# Patient Record
Sex: MALE | Race: WHITE | Employment: FULL TIME | URBAN - METROPOLITAN AREA
[De-identification: names, ages, dates, MRNs, and addresses within clinical notes are randomized per-mention and may not be internally consistent; named-entity substitution may affect disease eponyms.]

---

## 2018-03-08 ENCOUNTER — OFFICE VISIT (OUTPATIENT)
Dept: ORTHOPEDIC SURGERY | Age: 58
End: 2018-03-08

## 2018-03-08 VITALS
SYSTOLIC BLOOD PRESSURE: 118 MMHG | HEART RATE: 78 BPM | WEIGHT: 175 LBS | BODY MASS INDEX: 23.7 KG/M2 | HEIGHT: 72 IN | DIASTOLIC BLOOD PRESSURE: 78 MMHG

## 2018-03-08 DIAGNOSIS — M20.41 HAMMERTOE OF SECOND TOE OF RIGHT FOOT: ICD-10-CM

## 2018-03-08 DIAGNOSIS — S99.921A PLANTAR PLATE INJURY, RIGHT, INITIAL ENCOUNTER: Primary | ICD-10-CM

## 2018-03-08 PROCEDURE — 99203 OFFICE O/P NEW LOW 30 MIN: CPT | Performed by: PODIATRIST

## 2018-03-08 NOTE — PROGRESS NOTES
HISTORY OF PRESENT ILLNESS: This is an initial visit for 63-year-old male who presents with chronic right forefoot pain. He's had pain to the right forefoot for a few years but it was more recently diagnosed with the partial plantar plate tear of the 2nd MTP. He was immobilized in a postop shoe along with a splinting technique of the 2nd toe to offload the 2nd MTP. He states that it did help slightly but ultimately he still has a lot of pain with any increased activity. He cannot recall any direct injury. He states that he actually has a plantar plate tear also on his left 2nd MTP. However, that is not as painful. FAMILY HISTORY: Documented in chart. SOCIAL HISTORY: Documented in chart. REVIEW OF SYSTEMS:  The patient denies any issues with dermatologic, pulmonary, cardiovascular, genitourinary, hematologic, gastrointestinal, neurologic, psychiatric, and HEENT systems. PHYSICAL EXAMINATION:     The patient is alert and orientated x3. He has pinpoint palpable tenderness at the plantar aspect of the right 2nd MTP. The 2nd digit demonstrates a flexion contracture over the PIPJ. The skin is irritated from the tape it appears. No open lesions are noted. There are no signs of infection. He has palpable pedal pulses. His sensation is grossly intact. The remainder of the exam is unremarkable. RADIOGRAPHS: 3 weightbearing views of the right foot were taken. These demonstrate good alignment at the 2nd MTP without joint distention or angulation. ASSESSMENT: 2nd MTP Capsulitis and metatarsalgia, right foot      PLAN: The patient was educated on the pathology and its treatment options. He has undergone several months of conservative treatment which included taping and use of a postop shoe. He also received a single cortisone injection which has not helped. I would like to evaluate further with MRI of the right foot.

## 2018-03-19 ENCOUNTER — OFFICE VISIT (OUTPATIENT)
Dept: ORTHOPEDIC SURGERY | Age: 58
End: 2018-03-19

## 2018-03-19 VITALS
DIASTOLIC BLOOD PRESSURE: 83 MMHG | BODY MASS INDEX: 23.71 KG/M2 | WEIGHT: 175.04 LBS | HEART RATE: 76 BPM | SYSTOLIC BLOOD PRESSURE: 114 MMHG | HEIGHT: 72 IN

## 2018-03-19 DIAGNOSIS — M77.8 CAPSULITIS OF FOOT, RIGHT: Primary | ICD-10-CM

## 2018-03-19 PROCEDURE — 99213 OFFICE O/P EST LOW 20 MIN: CPT | Performed by: PODIATRIST

## 2018-04-02 ENCOUNTER — OFFICE VISIT (OUTPATIENT)
Dept: ORTHOPEDIC SURGERY | Age: 58
End: 2018-04-02

## 2018-04-02 VITALS
SYSTOLIC BLOOD PRESSURE: 143 MMHG | WEIGHT: 175.04 LBS | HEIGHT: 72 IN | DIASTOLIC BLOOD PRESSURE: 88 MMHG | BODY MASS INDEX: 23.71 KG/M2 | HEART RATE: 64 BPM

## 2018-04-02 DIAGNOSIS — S99.921A PLANTAR PLATE INJURY, RIGHT, INITIAL ENCOUNTER: Primary | ICD-10-CM

## 2018-04-02 PROCEDURE — 20550 NJX 1 TENDON SHEATH/LIGAMENT: CPT | Performed by: PODIATRIST

## 2018-04-02 PROCEDURE — 99213 OFFICE O/P EST LOW 20 MIN: CPT | Performed by: PODIATRIST

## 2019-01-31 ENCOUNTER — OFFICE VISIT (OUTPATIENT)
Dept: ORTHOPEDIC SURGERY | Age: 59
End: 2019-01-31
Payer: COMMERCIAL

## 2019-01-31 VITALS
WEIGHT: 175.04 LBS | SYSTOLIC BLOOD PRESSURE: 126 MMHG | HEIGHT: 72 IN | DIASTOLIC BLOOD PRESSURE: 69 MMHG | BODY MASS INDEX: 23.71 KG/M2 | HEART RATE: 74 BPM

## 2019-01-31 DIAGNOSIS — S99.921A PLANTAR PLATE INJURY, RIGHT, INITIAL ENCOUNTER: ICD-10-CM

## 2019-01-31 DIAGNOSIS — M20.41 HAMMERTOE OF SECOND TOE OF RIGHT FOOT: ICD-10-CM

## 2019-01-31 DIAGNOSIS — M77.8 CAPSULITIS OF FOOT, RIGHT: Primary | ICD-10-CM

## 2019-01-31 PROCEDURE — 99213 OFFICE O/P EST LOW 20 MIN: CPT | Performed by: PODIATRIST

## 2019-01-31 RX ORDER — FLUTICASONE PROPIONATE 50 MCG
1 SPRAY, SUSPENSION (ML) NASAL
COMMUNITY
Start: 2017-08-10 | End: 2022-06-30 | Stop reason: ALTCHOICE

## 2019-01-31 RX ORDER — RANITIDINE 150 MG/1
300 TABLET ORAL
COMMUNITY
Start: 2017-08-10 | End: 2022-06-30

## 2022-06-29 SDOH — HEALTH STABILITY: PHYSICAL HEALTH: ON AVERAGE, HOW MANY DAYS PER WEEK DO YOU ENGAGE IN MODERATE TO STRENUOUS EXERCISE (LIKE A BRISK WALK)?: 4 DAYS

## 2022-06-29 SDOH — HEALTH STABILITY: PHYSICAL HEALTH: ON AVERAGE, HOW MANY MINUTES DO YOU ENGAGE IN EXERCISE AT THIS LEVEL?: 40 MIN

## 2022-06-29 ASSESSMENT — SOCIAL DETERMINANTS OF HEALTH (SDOH)
WITHIN THE LAST YEAR, HAVE TO BEEN RAPED OR FORCED TO HAVE ANY KIND OF SEXUAL ACTIVITY BY YOUR PARTNER OR EX-PARTNER?: NO
WITHIN THE LAST YEAR, HAVE YOU BEEN HUMILIATED OR EMOTIONALLY ABUSED IN OTHER WAYS BY YOUR PARTNER OR EX-PARTNER?: NO
WITHIN THE LAST YEAR, HAVE YOU BEEN KICKED, HIT, SLAPPED, OR OTHERWISE PHYSICALLY HURT BY YOUR PARTNER OR EX-PARTNER?: NO
WITHIN THE LAST YEAR, HAVE YOU BEEN AFRAID OF YOUR PARTNER OR EX-PARTNER?: NO

## 2022-06-30 ENCOUNTER — OFFICE VISIT (OUTPATIENT)
Dept: INTERNAL MEDICINE CLINIC | Age: 62
End: 2022-06-30
Payer: COMMERCIAL

## 2022-06-30 VITALS
SYSTOLIC BLOOD PRESSURE: 122 MMHG | BODY MASS INDEX: 23.57 KG/M2 | HEART RATE: 71 BPM | HEIGHT: 72 IN | WEIGHT: 174 LBS | DIASTOLIC BLOOD PRESSURE: 80 MMHG | OXYGEN SATURATION: 98 %

## 2022-06-30 DIAGNOSIS — Z00.00 ANNUAL PHYSICAL EXAM: ICD-10-CM

## 2022-06-30 LAB
HCT VFR BLD CALC: 42.5 % (ref 40.5–52.5)
HEMOGLOBIN: 14.1 G/DL (ref 13.5–17.5)
MCH RBC QN AUTO: 30.4 PG (ref 26–34)
MCHC RBC AUTO-ENTMCNC: 33.1 G/DL (ref 31–36)
MCV RBC AUTO: 91.8 FL (ref 80–100)
PDW BLD-RTO: 13.5 % (ref 12.4–15.4)
PLATELET # BLD: 299 K/UL (ref 135–450)
PMV BLD AUTO: 9.7 FL (ref 5–10.5)
RBC # BLD: 4.63 M/UL (ref 4.2–5.9)
WBC # BLD: 7.4 K/UL (ref 4–11)

## 2022-06-30 PROCEDURE — 99386 PREV VISIT NEW AGE 40-64: CPT | Performed by: HOSPITALIST

## 2022-06-30 SDOH — ECONOMIC STABILITY: FOOD INSECURITY: WITHIN THE PAST 12 MONTHS, THE FOOD YOU BOUGHT JUST DIDN'T LAST AND YOU DIDN'T HAVE MONEY TO GET MORE.: NEVER TRUE

## 2022-06-30 SDOH — ECONOMIC STABILITY: FOOD INSECURITY: WITHIN THE PAST 12 MONTHS, YOU WORRIED THAT YOUR FOOD WOULD RUN OUT BEFORE YOU GOT MONEY TO BUY MORE.: NEVER TRUE

## 2022-06-30 ASSESSMENT — ENCOUNTER SYMPTOMS
ABDOMINAL DISTENTION: 0
SINUS PRESSURE: 0
NAUSEA: 0
SINUS PAIN: 0
VOMITING: 0
WHEEZING: 0
CHEST TIGHTNESS: 0
BACK PAIN: 0
VOICE CHANGE: 0
ABDOMINAL PAIN: 0
DIARRHEA: 1
CONSTIPATION: 0
COUGH: 0
TROUBLE SWALLOWING: 0
SORE THROAT: 0
BLOOD IN STOOL: 0
SHORTNESS OF BREATH: 0

## 2022-06-30 ASSESSMENT — PATIENT HEALTH QUESTIONNAIRE - PHQ9
SUM OF ALL RESPONSES TO PHQ QUESTIONS 1-9: 0
1. LITTLE INTEREST OR PLEASURE IN DOING THINGS: 0
SUM OF ALL RESPONSES TO PHQ9 QUESTIONS 1 & 2: 0
SUM OF ALL RESPONSES TO PHQ QUESTIONS 1-9: 0
2. FEELING DOWN, DEPRESSED OR HOPELESS: 0

## 2022-06-30 ASSESSMENT — SOCIAL DETERMINANTS OF HEALTH (SDOH): HOW HARD IS IT FOR YOU TO PAY FOR THE VERY BASICS LIKE FOOD, HOUSING, MEDICAL CARE, AND HEATING?: NOT HARD AT ALL

## 2022-06-30 NOTE — PROGRESS NOTES
[facility]  Establish care visit   2022    Rojelio Malone (:  1960) is a 63 y.o. male, here to establish care. Chief Complaint   Patient presents with   Zannie Cowden Establish Care        Patient Active Problem List   Diagnosis    Plantar plate injury, right, initial encounter    Hammertoe of second toe of right foot    Capsulitis of foot, right    Annual physical exam       HPI  The patient just wants to establish care.  -He had COVID about a year ago. HE has had two step vaccine + booster.   -He quit smoking 18 years ago.  -2-3 beers nightly. ROS  Review of Systems   Constitutional: Negative for activity change, appetite change, chills, diaphoresis, fatigue, fever and unexpected weight change. HENT: Positive for hearing loss (In the right ear after COVID a year ago. ). Negative for sinus pressure, sinus pain, sore throat, trouble swallowing and voice change. Eyes: Visual disturbance: Wears contacts. Respiratory: Negative for cough, chest tightness, shortness of breath and wheezing. Cardiovascular: Negative for chest pain, palpitations and leg swelling. Gastrointestinal: Positive for diarrhea (2 times weekly. ). Negative for abdominal distention, abdominal pain, blood in stool, constipation, nausea and vomiting. Endocrine: Negative for polydipsia and polyphagia. Genitourinary: Negative for decreased urine volume, difficulty urinating, dysuria and urgency. Musculoskeletal: Negative for back pain, gait problem, joint swelling and myalgias. Neurological: Negative for dizziness, seizures, syncope, light-headedness and headaches. Psychiatric/Behavioral: Negative for agitation, behavioral problems, confusion and suicidal ideas. The patient is nervous/anxious. HISTORIES  No current outpatient medications on file prior to visit. No current facility-administered medications on file prior to visit. No Known Allergies    History reviewed.  No pertinent past medical history. Patient Active Problem List   Diagnosis    Plantar plate injury, right, initial encounter    Hammertoe of second toe of right foot    Capsulitis of foot, right    Annual physical exam       History reviewed. No pertinent surgical history. Social History     Socioeconomic History    Marital status: Single     Spouse name: Not on file    Number of children: Not on file    Years of education: Not on file    Highest education level: Not on file   Occupational History    Not on file   Tobacco Use    Smoking status: Former Smoker     Packs/day: 0.50     Years: 18.00     Pack years: 9.00     Types: Cigarettes     Quit date:      Years since quittin.5    Smokeless tobacco: Never Used   Substance and Sexual Activity    Alcohol use: Yes     Comment: social     Drug use: No    Sexual activity: Yes     Partners: Female   Other Topics Concern    Not on file   Social History Narrative    Not on file     Social Determinants of Health     Financial Resource Strain: Low Risk     Difficulty of Paying Living Expenses: Not hard at all   Food Insecurity: No Food Insecurity    Worried About [email or web address] in the Last Year: Never true    [address] in the Last Year: Never true   Transportation Needs:     Lack of Transportation (Medical): Not on file    Lack of Transportation (Non-Medical):  Not on file   Physical Activity: Sufficiently Active    Days of Exercise per Week: 4 days    Minutes of Exercise per Session: 40 min   Stress:     Feeling of Stress : Not on file   Social Connections:     Frequency of Communication with Friends and Family: Not on file    Frequency of Social Gatherings with Friends and Family: Not on file    Attends Oriental orthodox Services: Not on file    Active Member of Clubs or Organizations: Not on file    Attends Club or Organization Meetings: Not on file    Marital Status: Not on file   Intimate Partner Violence: Not At Risk    Fear of Current or Ex-Partner: No    Emotionally Abused: No    Physically Abused: No    Sexually Abused: No   Housing Stability:     Unable to Pay for Housing in the Last Year: Not on file    Number of Places Lived in the Last Year: Not on file    Unstable Housing in the Last Year: Not on file        History reviewed. No pertinent family history. PE  Vitals:    06/30/22 1426   BP: 122/80   Pulse: 71   SpO2: 98%   Weight: 174 lb (78.9 kg)   Height: 6' (1.829 m)     Estimated body mass index is 23.6 kg/m² as calculated from the following:    Height as of this encounter: 6' (1.829 m). Weight as of this encounter: 174 lb (78.9 kg). Physical Exam  Vitals reviewed. Constitutional:       General: He is not in acute distress. Appearance: Normal appearance. HENT:      Head: Normocephalic and atraumatic. Mouth/Throat:      Pharynx: Oropharynx is clear. Eyes:      Conjunctiva/sclera: Conjunctivae normal.      Pupils: Pupils are equal, round, and reactive to light. Cardiovascular:      Rate and Rhythm: Normal rate and regular rhythm. Pulses: Normal pulses. Heart sounds: Normal heart sounds. Pulmonary:      Effort: Pulmonary effort is normal. No respiratory distress. Breath sounds: Normal breath sounds. No wheezing or rales. Abdominal:      Palpations: Abdomen is soft. Tenderness: There is no abdominal tenderness. There is no rebound. Musculoskeletal:         General: No signs of injury. Normal range of motion. Cervical back: Normal range of motion and neck supple. Skin:     General: Skin is warm and dry. Coloration: Skin is not jaundiced. Neurological:      General: No focal deficit present. Mental Status: He is alert and oriented to person, place, and time. Psychiatric:         Behavior: Behavior normal.         Thought Content:  Thought content normal.         Judgment: Judgment normal.         Immunization History   Administered Date(s) Administered    COVID-19, PFIZER GRAY top, DO NOT Dilute, (age 15 y+), IM, 30 mcg/0.3 mL 01/24/2022    COVID-19, PFIZER PURPLE top, DILUTE for use, (age 15 y+), 30mcg/0.3mL 03/18/2021, 04/08/2021       Health Maintenance   Topic Date Due    Depression Screen  Never done    HIV screen  Never done    Hepatitis C screen  Never done    Lipids  Never done    Prostate Specific Antigen (PSA) Screening or Monitoring  Never done    Colorectal Cancer Screen  Never done    Shingles vaccine (1 of 2) Never done    Flu vaccine (Season Ended) 09/01/2022    DTaP/Tdap/Td vaccine (3 - Td or Tdap) 11/22/2029    COVID-19 Vaccine  Completed    Hepatitis A vaccine  Aged Out    Hepatitis B vaccine  Aged Out    Hib vaccine  Aged Out    Meningococcal (ACWY) vaccine  Aged Out    Pneumococcal 0-64 years Vaccine  Aged Out       PSH, PMH, SH and FH reviewed and noted. Recent and past labs, tests and consults also reviewed. Recent or new meds also reviewed. ASSESSMENT/ PLAN:  1. Annual physical exam  - CBC; Future  - Comprehensive Metabolic Panel; Future  - Lipid, Fasting; Future  - Hemoglobin A1C; Future  - PSA, Prostatic Specific Antigen; Future  - TSH with Reflex; Future  - HIV Screen; Future  - Hepatitis C Antibody; Future  - Rony Marcano MD, Gastroenterology ([facility]), [facility]       Orders Placed This Encounter   Procedures    CBC    Comprehensive Metabolic Panel    Lipid, Fasting    Hemoglobin A1C    PSA, Prostatic Specific Antigen    TSH with Reflex    HIV Screen    Hepatitis C Antibody    AFL - Beatriz Obrien MD, Gastroenterology ([facility]), [facility]     No orders of the defined types were placed in this encounter. Medications Discontinued During This Encounter   Medication Reason    ranitidine (ZANTAC) 150 MG tablet     fluticasone (FLONASE) 50 MCG/ACT nasal spray Therapy completed        Return in about 1 year (around 6/30/2023).     Padmini Billingsley MD    This dictation was generated by voice recognition computer software. Although all attempts are made to edit the dictation for accuracy, there may be errors in the transcription that are not intended.

## 2022-07-01 LAB
A/G RATIO: 1.8 (ref 1.1–2.2)
ALBUMIN SERPL-MCNC: 4.8 G/DL (ref 3.4–5)
ALP BLD-CCNC: 102 U/L (ref 40–129)
ALT SERPL-CCNC: 12 U/L (ref 10–40)
ANION GAP SERPL CALCULATED.3IONS-SCNC: 16 MMOL/L (ref 3–16)
AST SERPL-CCNC: 21 U/L (ref 15–37)
BILIRUB SERPL-MCNC: 0.6 MG/DL (ref 0–1)
BUN BLDV-MCNC: 20 MG/DL (ref 7–20)
CALCIUM SERPL-MCNC: 9.9 MG/DL (ref 8.3–10.6)
CHLORIDE BLD-SCNC: 99 MMOL/L (ref 99–110)
CHOLESTEROL, FASTING: 230 MG/DL (ref 0–199)
CO2: 24 MMOL/L (ref 21–32)
CREAT SERPL-MCNC: 1.2 MG/DL (ref 0.9–1.3)
ESTIMATED AVERAGE GLUCOSE: 105.4 MG/DL
GFR AFRICAN AMERICAN: >60
GFR NON-AFRICAN AMERICAN: >60
GLUCOSE BLD-MCNC: 90 MG/DL (ref 70–99)
HBA1C MFR BLD: 5.3 %
HDLC SERPL-MCNC: 74 MG/DL (ref 40–60)
HEPATITIS C ANTIBODY INTERPRETATION: NORMAL
HIV AG/AB: NORMAL
HIV ANTIGEN: NORMAL
HIV-1 ANTIBODY: NORMAL
HIV-2 AB: NORMAL
LDL CHOLESTEROL CALCULATED: 131 MG/DL
POTASSIUM SERPL-SCNC: 4.6 MMOL/L (ref 3.5–5.1)
PROSTATE SPECIFIC ANTIGEN: 0.86 NG/ML (ref 0–4)
SODIUM BLD-SCNC: 139 MMOL/L (ref 136–145)
TOTAL PROTEIN: 7.5 G/DL (ref 6.4–8.2)
TRIGLYCERIDE, FASTING: 123 MG/DL (ref 0–150)
TSH REFLEX: 1.85 UIU/ML (ref 0.27–4.2)
VLDLC SERPL CALC-MCNC: 25 MG/DL

## 2022-07-08 DIAGNOSIS — E78.00 HIGH CHOLESTEROL: Primary | ICD-10-CM

## 2022-07-08 RX ORDER — ATORVASTATIN CALCIUM 20 MG/1
20 TABLET, FILM COATED ORAL NIGHTLY
Qty: 30 TABLET | Refills: 2 | Status: SHIPPED | OUTPATIENT
Start: 2022-07-08 | End: 2022-10-08

## 2022-07-30 PROBLEM — Z00.00 ANNUAL PHYSICAL EXAM: Status: RESOLVED | Noted: 2022-06-30 | Resolved: 2022-07-30

## 2022-08-18 ENCOUNTER — TELEPHONE (OUTPATIENT)
Dept: INTERNAL MEDICINE CLINIC | Age: 62
End: 2022-08-18

## 2022-08-18 NOTE — TELEPHONE ENCOUNTER
I left the patient a voice mail regarding his GI referral. They have made several attempts to reach him. They've left messages with no return call.

## 2024-01-22 ENCOUNTER — OFFICE VISIT (OUTPATIENT)
Dept: INTERNAL MEDICINE CLINIC | Age: 64
End: 2024-01-22
Payer: COMMERCIAL

## 2024-01-22 VITALS
DIASTOLIC BLOOD PRESSURE: 92 MMHG | WEIGHT: 173 LBS | HEART RATE: 67 BPM | BODY MASS INDEX: 23.43 KG/M2 | HEIGHT: 72 IN | OXYGEN SATURATION: 98 % | TEMPERATURE: 97.1 F | SYSTOLIC BLOOD PRESSURE: 136 MMHG

## 2024-01-22 DIAGNOSIS — R82.998 BROWN-COLORED URINE: Primary | ICD-10-CM

## 2024-01-22 DIAGNOSIS — R53.1 WEAKNESS: ICD-10-CM

## 2024-01-22 DIAGNOSIS — R80.9 URINE PROTEIN INCREASED: Primary | ICD-10-CM

## 2024-01-22 DIAGNOSIS — R82.998 BROWN-COLORED URINE: ICD-10-CM

## 2024-01-22 DIAGNOSIS — J06.9 VIRAL URI: ICD-10-CM

## 2024-01-22 LAB
ALBUMIN SERPL-MCNC: 4.7 G/DL (ref 3.4–5)
ALBUMIN/GLOB SERPL: 2 {RATIO} (ref 1.1–2.2)
ALP SERPL-CCNC: 117 U/L (ref 40–129)
ALT SERPL-CCNC: 17 U/L (ref 10–40)
ANION GAP SERPL CALCULATED.3IONS-SCNC: 8 MMOL/L (ref 3–16)
AST SERPL-CCNC: 21 U/L (ref 15–37)
BACTERIA URNS QL MICRO: ABNORMAL /HPF
BASOPHILS # BLD: 0.1 K/UL (ref 0–0.2)
BASOPHILS NFR BLD: 0.9 %
BILIRUB SERPL-MCNC: 0.4 MG/DL (ref 0–1)
BILIRUB UR QL STRIP.AUTO: ABNORMAL
BILIRUBIN, POC: NORMAL
BLOOD URINE, POC: NORMAL
BUN SERPL-MCNC: 17 MG/DL (ref 7–20)
CALCIUM SERPL-MCNC: 9.2 MG/DL (ref 8.3–10.6)
CHARACTER UR: ABNORMAL
CHLORIDE SERPL-SCNC: 95 MMOL/L (ref 99–110)
CK SERPL-CCNC: 114 U/L (ref 39–308)
CLARITY UR: ABNORMAL
CLARITY, POC: CLEAR
CO2 SERPL-SCNC: 33 MMOL/L (ref 21–32)
COLOR UR: ABNORMAL
COLOR, POC: NORMAL
CREAT SERPL-MCNC: 1.1 MG/DL (ref 0.8–1.3)
DEPRECATED RDW RBC AUTO: 13.5 % (ref 12.4–15.4)
EOSINOPHIL # BLD: 0.1 K/UL (ref 0–0.6)
EOSINOPHIL NFR BLD: 2.3 %
EPI CELLS #/AREA URNS HPF: ABNORMAL /HPF (ref 0–5)
GFR SERPLBLD CREATININE-BSD FMLA CKD-EPI: >60 ML/MIN/{1.73_M2}
GLUCOSE SERPL-MCNC: 93 MG/DL (ref 70–99)
GLUCOSE UR STRIP.AUTO-MCNC: NEGATIVE MG/DL
GLUCOSE URINE, POC: NORMAL
HCT VFR BLD AUTO: 43.1 % (ref 40.5–52.5)
HGB BLD-MCNC: 14.3 G/DL (ref 13.5–17.5)
HGB UR QL STRIP.AUTO: NEGATIVE
HYALINE CASTS #/AREA URNS LPF: ABNORMAL /LPF (ref 0–2)
KETONES UR STRIP.AUTO-MCNC: ABNORMAL MG/DL
KETONES, POC: NORMAL
LEUKOCYTE EST, POC: NORMAL
LEUKOCYTE ESTERASE UR QL STRIP.AUTO: NEGATIVE
LYMPHOCYTES # BLD: 1.7 K/UL (ref 1–5.1)
LYMPHOCYTES NFR BLD: 28.2 %
MCH RBC QN AUTO: 30.3 PG (ref 26–34)
MCHC RBC AUTO-ENTMCNC: 33.2 G/DL (ref 31–36)
MCV RBC AUTO: 91.4 FL (ref 80–100)
MONOCYTES # BLD: 0.7 K/UL (ref 0–1.3)
MONOCYTES NFR BLD: 10.9 %
NEUTROPHILS # BLD: 3.5 K/UL (ref 1.7–7.7)
NEUTROPHILS NFR BLD: 57.7 %
NITRITE UR QL STRIP.AUTO: NEGATIVE
NITRITE, POC: NORMAL
PH UR STRIP.AUTO: 5.5 [PH] (ref 5–8)
PH, POC: 6
PLATELET # BLD AUTO: 352 K/UL (ref 135–450)
PMV BLD AUTO: 9.5 FL (ref 5–10.5)
POTASSIUM SERPL-SCNC: 4.3 MMOL/L (ref 3.5–5.1)
PROT SERPL-MCNC: 7 G/DL (ref 6.4–8.2)
PROT UR STRIP.AUTO-MCNC: 100 MG/DL
PROTEIN, POC: NORMAL
RBC # BLD AUTO: 4.71 M/UL (ref 4.2–5.9)
RBC #/AREA URNS HPF: ABNORMAL /HPF (ref 0–4)
SODIUM SERPL-SCNC: 136 MMOL/L (ref 136–145)
SP GR UR STRIP.AUTO: 1.03 (ref 1–1.03)
SPECIFIC GRAVITY, POC: 1.03
TSH SERPL DL<=0.005 MIU/L-ACNC: 1.76 UIU/ML (ref 0.27–4.2)
UA DIPSTICK W REFLEX MICRO PNL UR: YES
URN SPEC COLLECT METH UR: ABNORMAL
UROBILINOGEN UR STRIP-ACNC: 1 E.U./DL
UROBILINOGEN, POC: 0.2
WBC # BLD AUTO: 6.1 K/UL (ref 4–11)
WBC #/AREA URNS HPF: ABNORMAL /HPF (ref 0–5)

## 2024-01-22 PROCEDURE — 99213 OFFICE O/P EST LOW 20 MIN: CPT | Performed by: STUDENT IN AN ORGANIZED HEALTH CARE EDUCATION/TRAINING PROGRAM

## 2024-01-22 PROCEDURE — 81002 URINALYSIS NONAUTO W/O SCOPE: CPT | Performed by: STUDENT IN AN ORGANIZED HEALTH CARE EDUCATION/TRAINING PROGRAM

## 2024-01-22 ASSESSMENT — ENCOUNTER SYMPTOMS
EYE REDNESS: 0
SINUS PAIN: 0
EYE PAIN: 0
COUGH: 1
SHORTNESS OF BREATH: 0
SINUS PRESSURE: 0
CONSTIPATION: 0
DIARRHEA: 0
EYE DISCHARGE: 0
VOMITING: 0

## 2024-01-22 NOTE — ASSESSMENT & PLAN NOTE
Concern for rhabdomyolysis. Recent viral URI with myalgias, muscle weakness, and discolored urine. Dipstick with large bili.  - will check urine micro  - CK ordered  - CMP ordered  - CBC ordered  - patient advised to drink more water

## 2024-01-22 NOTE — PROGRESS NOTES
Appearance: Normal appearance. He is not toxic-appearing.   HENT:      Head: Normocephalic.   Eyes:      General: No scleral icterus.     Conjunctiva/sclera: Conjunctivae normal.   Cardiovascular:      Rate and Rhythm: Normal rate and regular rhythm.      Pulses: Normal pulses.      Heart sounds: Normal heart sounds.   Pulmonary:      Effort: Pulmonary effort is normal. No respiratory distress.      Breath sounds: Normal breath sounds. No rales.   Abdominal:      General: There is no distension.      Tenderness: There is no abdominal tenderness.   Musculoskeletal:         General: No swelling.   Skin:     General: Skin is warm and dry.   Neurological:      General: No focal deficit present.      Mental Status: He is alert and oriented to person, place, and time. Mental status is at baseline.      Gait: Gait normal.   Psychiatric:         Mood and Affect: Mood normal.         ASSESSMENT/PLAN:    Brown-colored urine  Concern for rhabdomyolysis. Recent viral URI with myalgias, muscle weakness, and discolored urine. Dipstick with large bili.  - will check urine micro  - CK ordered  - CMP ordered  - CBC ordered  - patient advised to drink more water     Viral URI  5 day duration of URI with cough, myalgia, dark urine, chills, shortness of breath. Symptoms have mostly resolved except for urine remains abnormally dark and hand  remains weak.   - r/o rhabdo as result of URI    Weakness  Noted decreased strength of hand . Suspicion for rhabdo, will check thyroid and CBC as well.  - TSH  - CK  - CBC  - UA w/ micro       Orders Placed This Encounter    Comprehensive Metabolic Panel     Standing Status:   Future     Number of Occurrences:   1     Standing Expiration Date:   1/21/2025    CBC with Auto Differential     Standing Status:   Future     Number of Occurrences:   1     Standing Expiration Date:   1/21/2025    TSH with Reflex     Standing Status:   Future     Number of Occurrences:   1     Standing Expiration

## 2024-01-22 NOTE — ASSESSMENT & PLAN NOTE
Noted decreased strength of hand . Suspicion for rhabdo, will check thyroid and CBC as well.  - TSH  - CK  - CBC  - UA w/ micro

## 2024-01-22 NOTE — ASSESSMENT & PLAN NOTE
5 day duration of URI with cough, myalgia, dark urine, chills, shortness of breath. Symptoms have mostly resolved except for urine remains abnormally dark and hand  remains weak.   - r/o rhabdo as result of URI

## 2024-01-23 DIAGNOSIS — R80.9 URINE PROTEIN INCREASED: ICD-10-CM

## 2024-01-23 LAB
CREAT UR-MCNC: 499.5 MG/DL (ref 39–259)
EST. AVERAGE GLUCOSE BLD GHB EST-MCNC: 111.2 MG/DL
HBA1C MFR BLD: 5.5 %
MICROALBUMIN UR DL<=1MG/L-MCNC: 4 MG/DL
MICROALBUMIN/CREAT UR: 8 MG/G (ref 0–30)
PROT UR-MCNC: 50 MG/DL
PROT/CREAT UR-RTO: 0.1 MG/DL

## 2024-01-24 DIAGNOSIS — R82.998 BROWN-COLORED URINE: Primary | ICD-10-CM
